# Patient Record
Sex: MALE | Race: BLACK OR AFRICAN AMERICAN
[De-identification: names, ages, dates, MRNs, and addresses within clinical notes are randomized per-mention and may not be internally consistent; named-entity substitution may affect disease eponyms.]

---

## 2019-09-01 ENCOUNTER — HOSPITAL ENCOUNTER (EMERGENCY)
Dept: HOSPITAL 53 - M ED | Age: 74
Discharge: HOME | End: 2019-09-01
Payer: MEDICARE

## 2019-09-01 VITALS — WEIGHT: 240.5 LBS | BODY MASS INDEX: 30.87 KG/M2 | HEIGHT: 74 IN

## 2019-09-01 VITALS — SYSTOLIC BLOOD PRESSURE: 188 MMHG | DIASTOLIC BLOOD PRESSURE: 77 MMHG
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
## 2019-09-01 DIAGNOSIS — S30.0XXA: Primary | ICD-10-CM

## 2019-09-01 DIAGNOSIS — S33.5XXA: ICD-10-CM

## 2019-09-01 DIAGNOSIS — Z79.899: ICD-10-CM

## 2019-09-01 NOTE — REP
Clinical:  Trauma.  Fall.

 

Technique:  Three views of the sacrum and coccyx.

 

Findings:

Sacrum is intact and the bilateral sacroiliac joints are symmetric and normal.

The coccyx appears relatively normal and the sacral coccygeal joint is grossly

unremarkable.

 

Impression:

No definite acute sacrococcygeal injury.

 

 

Electronically Signed by

Lon Daev MD 09/01/2019 06:07 P

## 2019-09-01 NOTE — REP
Clinical:  Trauma.  Fall.

 

Technique:  Single AP view of the pelvis.

 

Comparison:  None.

 

Findings:

Generalized age-related degenerative changes to the pelvis and bilateral hips

noted and symmetric.  No acute fracture or dislocation.  Surrounding soft tissues

normal.

 

Impression:

Age-related degenerative changes.

No acute fracture or dislocation.

 

 

Electronically Signed by

Lon Dave MD 09/01/2019 06:05 P

## 2019-09-01 NOTE — REP
Clinical:  Trauma.  Fall.

 

Technique:  AP, lateral, bilateral oblique and coned-down views of the

lumbosacral spine.

 

Comparison:  None.

 

Findings:

There is subtle loss of the anterior inferior vertebral body height at T12 with

irregularity to the inferior endplate suggesting the possibility of a mild

compression injury.  Correlation with physical examination and mechanism of

injury is recommended.  No prior examinations are available for comparison.

 

There is moderate/early advanced multilevel degenerative changes predominantly

involving L3-4 through L5-S1 including endplate sclerosis, osteophytosis, disc

space narrowing, and facet arthropathy with narrowing to the neural foramen.

Alignment is otherwise maintained and there is no obvious subluxation.

 

Impression:

1.  Possible subtle compression injury involving T12.  No prior examinations are

available for comparison and clinical correlation is recommended.

2.  Early advanced multilevel degenerative spondylosis at L3-S1.

 

 

Electronically Signed by

Lon Dave MD 09/01/2019 06:11 P

## 2019-09-02 NOTE — ED PDOC
Post-Departure Follow-Up


certified letter sent to pt re formal read of ls spine . no pcp listed. please o

btain pcp name and fax for follow up mlg Lundborg-Gray,Maja MD           Sep 2, 2019 07:25